# Patient Record
Sex: FEMALE | Race: OTHER | NOT HISPANIC OR LATINO | URBAN - METROPOLITAN AREA
[De-identification: names, ages, dates, MRNs, and addresses within clinical notes are randomized per-mention and may not be internally consistent; named-entity substitution may affect disease eponyms.]

---

## 2024-01-15 ENCOUNTER — EMERGENCY (EMERGENCY)
Facility: HOSPITAL | Age: 29
LOS: 1 days | Discharge: HOME | End: 2024-01-15
Admitting: EMERGENCY MEDICINE
Payer: COMMERCIAL

## 2024-01-15 VITALS
DIASTOLIC BLOOD PRESSURE: 98 MMHG | SYSTOLIC BLOOD PRESSURE: 142 MMHG | WEIGHT: 110.01 LBS | OXYGEN SATURATION: 98 % | TEMPERATURE: 98 F | RESPIRATION RATE: 15 BRPM | HEART RATE: 65 BPM | HEIGHT: 64 IN

## 2024-01-15 VITALS
OXYGEN SATURATION: 97 % | SYSTOLIC BLOOD PRESSURE: 123 MMHG | RESPIRATION RATE: 16 BRPM | TEMPERATURE: 98 F | HEART RATE: 64 BPM | DIASTOLIC BLOOD PRESSURE: 85 MMHG

## 2024-01-15 DIAGNOSIS — Z98.82 BREAST IMPLANT STATUS: Chronic | ICD-10-CM

## 2024-01-15 DIAGNOSIS — Z88.1 ALLERGY STATUS TO OTHER ANTIBIOTIC AGENTS STATUS: ICD-10-CM

## 2024-01-15 DIAGNOSIS — M79.644 PAIN IN RIGHT FINGER(S): ICD-10-CM
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
## 2024-01-15 DIAGNOSIS — Z98.890 OTHER SPECIFIED POSTPROCEDURAL STATES: Chronic | ICD-10-CM
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
## 2024-01-15 DIAGNOSIS — I09.9 RHEUMATIC HEART DISEASE, UNSPECIFIED: ICD-10-CM

## 2024-01-15 DIAGNOSIS — S60.011A CONTUSION OF RIGHT THUMB WITHOUT DAMAGE TO NAIL, INITIAL ENCOUNTER: ICD-10-CM

## 2024-01-15 PROCEDURE — 11740 EVACUATION SUBUNGUAL HMTMA: CPT

## 2024-01-15 PROCEDURE — 73130 X-RAY EXAM OF HAND: CPT | Mod: 26,RT

## 2024-01-15 PROCEDURE — 99284 EMERGENCY DEPT VISIT MOD MDM: CPT | Mod: 25

## 2024-01-15 RX ORDER — FLUCONAZOLE 150 MG/1
150 TABLET ORAL ONCE
Refills: 0 | Status: COMPLETED | OUTPATIENT
Start: 2024-01-15 | End: 2024-01-15

## 2024-01-15 RX ADMIN — Medication 500 MILLIGRAM(S): at 03:24

## 2024-01-15 RX ADMIN — Medication 1 TABLET(S): at 03:23

## 2024-01-15 RX ADMIN — FLUCONAZOLE 150 MILLIGRAM(S): 150 TABLET ORAL at 03:24

## 2024-01-15 NOTE — ED PROVIDER NOTE - NS ED MD DISPO DISCHARGE
1200 Preop complete. Wife at bedside. Text notifications initiated. No belongings. Paper anesthesia consent in chart.   Home

## 2024-01-15 NOTE — ED PROCEDURE NOTE - GENERAL PROCEDURE DETAILS
18 gauze sterile needle used to puncture the base of the R thumb without disrupting the nail matrix, site milked and expressed with moderate amount of fresh bloody drainage. Thumb soaked in diluted betadine solution, xeroform dressing applied, NV Intact, pt tolerated procedure well without complication

## 2024-01-15 NOTE — ED PROVIDER NOTE - CARE PROVIDERS DIRECT ADDRESSES
[email or web address]

## 2024-01-15 NOTE — ED PROVIDER NOTE - PHYSICAL EXAMINATION
Gen - WDWN, NAD, comfortable and non-toxic appearing  Skin - warm, dry, small partial puncture hole to the base of the R thumb nailbed with subungual hematoma, no active bleeding or dc or FB   HEENT - AT/NC, no nasal discharge, airway patent, neck supple and FROM  CV - S1S2, R/R/R  Resp - CTAB, no r/r/w  GI - soft, ND, NT, no CVAT b/l   MS - No acute or gross deformities noted to extremities. wound per skin section, R thumb with mild edema and subungual hematoma with TTP to the distal nailbed. NV Intact, +SILT, symmetric pulses b/l, brisk cap refill  Neuro - AxOx3, ambulatory without gait disturbance

## 2024-01-15 NOTE — ED PROVIDER NOTE - PROVIDER TOKENS
PROVIDER:[TOKEN:[[id number]]] PROVIDER:[TOKEN:[[id number]]] PROVIDER:[TOKEN:[[id number]]]

## 2024-01-15 NOTE — ED ADULT NURSE NOTE - NSFALLUNIVINTERV_ED_ALL_ED
Bed/Stretcher in lowest position, wheels locked, appropriate side rails in place/Call bell, personal items and telephone in reach/Instruct patient to call for assistance before getting out of bed/chair/stretcher/Non-slip footwear applied when patient is off stretcher/[place] to call system/Physically safe environment - no spills, clutter or unnecessary equipment/Purposeful proactive rounding/Room/bathroom lighting operational, light cord in reach Bed/Stretcher in lowest position, wheels locked, appropriate side rails in place/Call bell, personal items and telephone in reach/Instruct patient to call for assistance before getting out of bed/chair/stretcher/Non-slip footwear applied when patient is off stretcher/[place] to call system/Physically safe environment - no spills, clutter or unnecessary equipment/Purposeful proactive rounding/Room/bathroom lighting operational, light cord in reach Bed/Stretcher in lowest position, wheels locked, appropriate side rails in place/Call bell, personal items and telephone in reach/Instruct patient to call for assistance before getting out of bed/chair/stretcher/Non-slip footwear applied when patient is off stretcher/[place] to call system/Physically safe environment - no spills, clutter or unnecessary equipment/Purposeful proactive rounding/Room/bathroom lighting operational, light cord in reach

## 2024-01-15 NOTE — ED PROVIDER NOTE - CLINICAL SUMMARY MEDICAL DECISION MAKING FREE TEXT BOX
bacitracin and steristrips applied, wound care instructions provided pt with subungual hematoma to the R thumb region last night s/p crush injury, xray negative for fx or FB, pt self attempted to decompress the region with a partial puncture through the nailbed with no relief, nail trephination performed at bedside with a 18 gauze sterile needle used to puncture the base of the R thumb without disrupting the nail matrix, site milked and expressed with moderate amount of fresh bloody drainage. Thumb soaked in diluted betadine solution, xeroform dressing applied, given dose of augmentin, pain adequately controlled, NV Intact, pt tolerated procedure well. Encouraged continual warm soaks at home. wound care instructions provided, instructed to f/u with hand, strict return precautions discussed, pt verbalized understanding

## 2024-01-15 NOTE — ED PROVIDER NOTE - NSFOLLOWUPINSTRUCTIONS_ED_ALL_ED_FT
Subungual Hematoma    WHAT YOU NEED TO KNOW:    What is a subungual hematoma? A subungual hematoma is a collection of blood under your fingernail or toenail.    What are the signs and symptoms of a subungual hematoma?    Red or purplish blood collection under the nail    Throbbing pain and swelling in the affected finger or toe    Tenderness to the touch  How is a subungual hematoma treated?    Medicines:  Acetaminophen decreases pain and fever. It is available without a doctor's order. Ask how much to take and how often to take it. Follow directions. Read the labels of all other medicines you are using to see if they also contain acetaminophen, or ask your doctor or pharmacist. Acetaminophen can cause liver damage if not taken correctly.    NSAIDs, such as ibuprofen, help decrease swelling, pain, and fever. This medicine is available with or without a doctor's order. NSAIDs can cause stomach bleeding or kidney problems in certain people. If you take blood thinner medicine, always ask your healthcare provider if NSAIDs are safe for you. Always read the medicine label and follow directions.    One or more small holes may be made in your nail to drain the blood. This will help decrease pain and swelling.    Nail removal may be done if your nail is damaged. Your healthcare provider may also need to repair the tissue under your nail.    A splint may be placed on your finger or toe to prevent movement and protect the area while it heals.  How can I manage my symptoms?    Apply ice on your finger or toe for 15 to 20 minutes every hour or as directed. Use an ice pack, or put crushed ice in a plastic bag. Cover it with a towel. Ice helps prevent tissue damage and decreases swelling and pain.    Elevate your finger or toe above the level of your heart as often as you can. This will help decrease swelling and pain. Prop your finger or toe on pillows or blankets to keep it elevated comfortably.    Gently trim your nail if it begins to fall off in pieces. This may decrease your risk for catching the nail on an object or ripping it off.  Correct way to trim toenails      Wear comfortable shoes that fit correctly to prevent more injury to your toe.  When should I call my doctor?    You have increased redness, swelling, or pain.    You notice pus or a bad smell coming from your nail.    You see red streaks on your finger or toe that starts from your nail.    Your nail falls off and there is bleeding.    You have questions or concerns about your condition or care. Subungual Hematoma    PLEASE CONTINUE TO SOAK YOUR THUMB IN WARM WATER X 5-10 MINS 5 TIMES DAILY FOR THE NEXT 3 DAYS. APPLY BACITRACIN TO THE NAILBED AFTER SOAKING     WHAT YOU NEED TO KNOW:    What is a subungual hematoma? A subungual hematoma is a collection of blood under your fingernail or toenail.    What are the signs and symptoms of a subungual hematoma?    Red or purplish blood collection under the nail    Throbbing pain and swelling in the affected finger or toe    Tenderness to the touch  How is a subungual hematoma treated?    Medicines:  Acetaminophen decreases pain and fever. It is available without a doctor's order. Ask how much to take and how often to take it. Follow directions. Read the labels of all other medicines you are using to see if they also contain acetaminophen, or ask your doctor or pharmacist. Acetaminophen can cause liver damage if not taken correctly.    NSAIDs, such as ibuprofen, help decrease swelling, pain, and fever. This medicine is available with or without a doctor's order. NSAIDs can cause stomach bleeding or kidney problems in certain people. If you take blood thinner medicine, always ask your healthcare provider if NSAIDs are safe for you. Always read the medicine label and follow directions.    One or more small holes may be made in your nail to drain the blood. This will help decrease pain and swelling.    Nail removal may be done if your nail is damaged. Your healthcare provider may also need to repair the tissue under your nail.    A splint may be placed on your finger or toe to prevent movement and protect the area while it heals.  How can I manage my symptoms?    Apply ice on your finger or toe for 15 to 20 minutes every hour or as directed. Use an ice pack, or put crushed ice in a plastic bag. Cover it with a towel. Ice helps prevent tissue damage and decreases swelling and pain.    Elevate your finger or toe above the level of your heart as often as you can. This will help decrease swelling and pain. Prop your finger or toe on pillows or blankets to keep it elevated comfortably.    Gently trim your nail if it begins to fall off in pieces. This may decrease your risk for catching the nail on an object or ripping it off.  Correct way to trim toenails      Wear comfortable shoes that fit correctly to prevent more injury to your toe.  When should I call my doctor?    You have increased redness, swelling, or pain.    You notice pus or a bad smell coming from your nail.    You see red streaks on your finger or toe that starts from your nail.    Your nail falls off and there is bleeding.    You have questions or concerns about your condition or care.

## 2024-01-15 NOTE — ED ADULT NURSE NOTE - OBJECTIVE STATEMENT
Pt with complaint of right 1st finger pain after slamming a car door on it tonight. Pt alert and oriented x3, ambulatory, respirations even and unlabored.

## 2024-01-15 NOTE — ED PROVIDER NOTE - PATIENT PORTAL LINK FT
You can access the FollowMyHealth Patient Portal offered by [facility] by registering at the following website: [email or web address]. By joining [facility]’s FollowMyHealth portal, you will also be able to view your health information using other applications (apps) compatible with our system. You can access the FollowMyHealth Patient Portal offered by [facility] by registering at the following website: [email or web address]. By joining [facility]’s FollowMyHealth portal, you will also be able to view your health information using other applications (apps) compatible with our system. You can access the FollowMyHealth Patient Portal offered by [facility] by registering at the following website: [email or web address]. By joining [employer]’s FollowMyHealth portal, you will also be able to view your health information using other applications (apps) compatible with our system.

## 2024-01-15 NOTE — ED PROVIDER NOTE - CARE PROVIDER_API CALL
Dae Meyer  Plastic Surgery  [address] [place] [id number]  Phone: [phone]  Fax: [phone]  Follow Up Time:    Dae Meyer  Plastic Surgery  [address] [place] [id number]  Phone: [phone]  Fax: [phone]  Follow Up Time:    Dae Meyer  Plastic Surgery  [address] [place] [id number]  Phone: [phone]  Fax: [phone]  Follow Up Time:

## 2024-01-15 NOTE — ED PROVIDER NOTE - OBJECTIVE STATEMENT
27 yo F with no known PMHx, RHD, presenting c/o R thumb pain s/p crush injury. Pt's R thumb accidentally got caught in a door, and noted pain and bruising under the nailbed around 8pm last night. Attempted to poke a hole through the nailbed with no success. Denies fever, chills, FB sensation, change in ROM/sensation, redness, paresthesia, purulent d/c, N/V, HA, dizziness, LOC, CP, SOB, and focal weakness